# Patient Record
Sex: FEMALE | Race: WHITE
[De-identification: names, ages, dates, MRNs, and addresses within clinical notes are randomized per-mention and may not be internally consistent; named-entity substitution may affect disease eponyms.]

---

## 2020-05-13 ENCOUNTER — HOSPITAL ENCOUNTER (OUTPATIENT)
Dept: HOSPITAL 19 - COL.LAB | Age: 70
End: 2020-05-13
Attending: ORTHOPAEDIC SURGERY
Payer: MEDICARE

## 2020-05-13 DIAGNOSIS — M25.562: Primary | ICD-10-CM

## 2020-05-13 LAB
APPEARANCE SNV: (no result)
COLOR SNV: YELLOW
ERYTHROCYTE [DISTWIDTH] IN BLOOD BY AUTOMATED COUNT: 12.7 % (ref 11.5–14.5)
ERYTHROCYTE [SEDIMENTATION RATE] IN BLOOD: 35 MM/HR (ref 0–30)
HCT VFR BLD AUTO: 33.4 % (ref 37–47)
HGB BLD-MCNC: 11.6 G/DL (ref 12.5–16)
MCH RBC QN AUTO: 31 PG (ref 27–31)
MCHC RBC AUTO-ENTMCNC: 35 G/DL (ref 33–37)
MCV RBC AUTO: 90 FL (ref 80–100)
MONONUC CELLS # SNV: 9.2 % (ref 0–75)
PLATELET # BLD AUTO: 273 K/MM3 (ref 130–400)
PMV BLD AUTO: 9.1 FL (ref 7.4–10.4)
RBC # BLD AUTO: 3.73 M/MM3 (ref 4.1–5.3)
RBC # SNV AUTO: (no result) /MM3 (ref 0–0)
WBC # SNV AUTO: (no result) /MM3 (ref 200–600)

## 2020-05-15 ENCOUNTER — HOSPITAL ENCOUNTER (OUTPATIENT)
Dept: HOSPITAL 19 - SDCO | Age: 70
Discharge: HOME | End: 2020-05-15
Attending: ORTHOPAEDIC SURGERY
Payer: MEDICARE

## 2020-05-15 VITALS — SYSTOLIC BLOOD PRESSURE: 178 MMHG | HEART RATE: 116 BPM | TEMPERATURE: 98 F | DIASTOLIC BLOOD PRESSURE: 83 MMHG

## 2020-05-15 VITALS — DIASTOLIC BLOOD PRESSURE: 63 MMHG | HEART RATE: 85 BPM | SYSTOLIC BLOOD PRESSURE: 149 MMHG

## 2020-05-15 VITALS — HEART RATE: 91 BPM | DIASTOLIC BLOOD PRESSURE: 75 MMHG | SYSTOLIC BLOOD PRESSURE: 162 MMHG

## 2020-05-15 VITALS — BODY MASS INDEX: 28.1 KG/M2 | WEIGHT: 185.41 LBS | HEIGHT: 67.99 IN

## 2020-05-15 VITALS — SYSTOLIC BLOOD PRESSURE: 154 MMHG | TEMPERATURE: 98.6 F | DIASTOLIC BLOOD PRESSURE: 75 MMHG | HEART RATE: 80 BPM

## 2020-05-15 VITALS — SYSTOLIC BLOOD PRESSURE: 147 MMHG | DIASTOLIC BLOOD PRESSURE: 54 MMHG | HEART RATE: 91 BPM

## 2020-05-15 VITALS — TEMPERATURE: 98.3 F

## 2020-05-15 VITALS — DIASTOLIC BLOOD PRESSURE: 66 MMHG | SYSTOLIC BLOOD PRESSURE: 159 MMHG | HEART RATE: 82 BPM

## 2020-05-15 DIAGNOSIS — Z79.82: ICD-10-CM

## 2020-05-15 DIAGNOSIS — Z88.1: ICD-10-CM

## 2020-05-15 DIAGNOSIS — M00.062: Primary | ICD-10-CM

## 2020-05-15 DIAGNOSIS — E03.9: ICD-10-CM

## 2020-05-15 DIAGNOSIS — B95.61: ICD-10-CM

## 2020-05-15 DIAGNOSIS — Z96.651: ICD-10-CM

## 2020-05-15 DIAGNOSIS — Z79.899: ICD-10-CM

## 2020-05-15 PROCEDURE — C1751 CATH, INF, PER/CENT/MIDLINE: HCPCS

## 2020-05-15 NOTE — NUR
VSS on room air. Tolerating PO well. Contacted PERFECTO Becerra at [facility] office regarding whether the patient needed her
daptomycin infusions initiated today at the hospital and she states no she
will start them tomorrow in [place].  is working on arranging
outpatient infusions for her in [place].

## 2020-05-15 NOTE — NUR
Patient arrives to [facility] via cart, accompanied by PACU RN Sandra. She is
sitting up in bed, alert and oriented. Monitoring is applied -VSS and WNL on
room air. She denies pain, nausea, or need. She has a clean, dry, intact ACE
bandage on her left knee. Her PICC line is saline locked. She requests and
receives ice chips and crackers to eat. Tolerates PO well. Will continue
to monitor.

## 2020-05-15 NOTE — NUR
AYUSH responded to consult. The patient is going to need four weeks of IV
antibiotics. 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 The patient was interested and agreeable with getting the IV
antibiotics set up there. AYUSH collaborated with the patient's RN and received
the antibiotic order and instructions from the attending physician. AYUSH
contacted Machelle at [facility] and faxed her the patient's information.
The patient's RN faxed the antibiotic order and instructions to [facility]. Machelle reports that they are good to accept the patient for the IV
antibiotics and to have the patient contact them for a time to come in. They
are familiar with the patient. AYUSH updated the patient. The patient verbalized
understanding and is agreeable to this. She states that she will be contacting
them to come in tomorrow. No additional needs at this time.

## 2020-05-15 NOTE — NUR
Patient voids. She dresses with the help of staff. Discharge instructions are
discussed, she denies any questions and verbalizes understanding. She is
escorted to the exit via wheelchair. She is discharged to home with ride in
private vehicle at 1730.

## 2020-05-15 NOTE — NUR
PICC LINE COMPLETED AND READY TO USE
DR MCCORMACK TALKED TO PATIENT AND MARKED OPERATIVE SITE
INFECTION DISEASE CONSULT WITH DR WALLACE CHEN - LEFT MESSAGE ON HIS CELL PHONE
AND INSTRUCTED HIM TO CALL DR EASTON SCOTT # [phone]

## 2020-05-27 ENCOUNTER — HOSPITAL ENCOUNTER (OUTPATIENT)
Dept: HOSPITAL 19 - COL.LAB | Age: 70
End: 2020-05-27
Attending: ORTHOPAEDIC SURGERY
Payer: MEDICARE

## 2020-05-27 DIAGNOSIS — M25.462: Primary | ICD-10-CM

## 2020-05-27 LAB
ERYTHROCYTE [DISTWIDTH] IN BLOOD BY AUTOMATED COUNT: 12.5 % (ref 11.5–14.5)
ERYTHROCYTE [SEDIMENTATION RATE] IN BLOOD: > 140 MM/HR (ref 0–30)
HCT VFR BLD AUTO: 30.1 % (ref 37–47)
HGB BLD-MCNC: 9.9 G/DL (ref 12.5–16)
MCH RBC QN AUTO: 30 PG (ref 27–31)
MCHC RBC AUTO-ENTMCNC: 33 G/DL (ref 33–37)
MCV RBC AUTO: 92 FL (ref 80–100)
PLATELET # BLD AUTO: 496 K/MM3 (ref 130–400)
PMV BLD AUTO: 9.2 FL (ref 7.4–10.4)
RBC # BLD AUTO: 3.26 M/MM3 (ref 4.1–5.3)

## 2020-05-28 ENCOUNTER — HOSPITAL ENCOUNTER (OUTPATIENT)
Dept: HOSPITAL 19 - ZCOL.LAB | Age: 70
End: 2020-05-28
Attending: ORTHOPAEDIC SURGERY
Payer: MEDICARE

## 2020-05-28 DIAGNOSIS — M25.462: Primary | ICD-10-CM
